# Patient Record
Sex: FEMALE | Race: WHITE
[De-identification: names, ages, dates, MRNs, and addresses within clinical notes are randomized per-mention and may not be internally consistent; named-entity substitution may affect disease eponyms.]

---

## 2018-07-14 ENCOUNTER — HOSPITAL ENCOUNTER (EMERGENCY)
Dept: HOSPITAL 25 - UCCORT | Age: 48
Discharge: HOME | End: 2018-07-14
Payer: COMMERCIAL

## 2018-07-14 VITALS — DIASTOLIC BLOOD PRESSURE: 87 MMHG | SYSTOLIC BLOOD PRESSURE: 141 MMHG

## 2018-07-14 DIAGNOSIS — N39.0: Primary | ICD-10-CM

## 2018-07-14 PROCEDURE — 87186 SC STD MICRODIL/AGAR DIL: CPT

## 2018-07-14 PROCEDURE — 81003 URINALYSIS AUTO W/O SCOPE: CPT

## 2018-07-14 PROCEDURE — 87077 CULTURE AEROBIC IDENTIFY: CPT

## 2018-07-14 PROCEDURE — 87086 URINE CULTURE/COLONY COUNT: CPT

## 2018-07-14 PROCEDURE — G0463 HOSPITAL OUTPT CLINIC VISIT: HCPCS

## 2018-07-14 PROCEDURE — 99202 OFFICE O/P NEW SF 15 MIN: CPT

## 2018-07-14 NOTE — UC
Complaint Female HPI





- HPI Summary


HPI Summary: 





48 year old female with UTI Sx. Woke this morning with urinary freq/pain/

burning. No fever 


[ End ]





- History Of Current Complaint


Chief Complaint: UCGU


Stated Complaint: URINARY


Time Seen by Provider: 07/14/18 10:26


Hx Obtained From: Patient


Hx Last Menstrual Period: 7/7/18


Onset/Duration: Sudden Onset


Timing: Constant


Severity Initially: Mild


Pain Intensity: 0


Aggravating Factor(s): Urination


Associated Signs And Symptoms: Positive: Negative





- Allergies/Home Medications


Allergies/Adverse Reactions: 


 Allergies











Allergy/AdvReac Type Severity Reaction Status Date / Time


 


No Known Allergies Allergy   Verified 07/14/18 10:37














PMH/Surg Hx/FS Hx/Imm Hx


Previously Healthy: Yes





- Surgical History


Surgical History: None





- Family History


Known Family History: Positive: None





- Social History


Occupation: Employed Full-time -  at [employer]


Alcohol Use: Occasionally


Substance Use Type: None


Smoking Status (MU): Never Smoked Tobacco





Review of Systems


Genitourinary: Dysuria, Frequency, Urgency


Is Patient Immunocompromised?: No


All Other Systems Reviewed And Are Negative: Yes





Physical Exam


Triage Information Reviewed: Yes


Appearance: Well-Appearing, No Pain Distress, Well-Nourished


Vital Signs: 


 Initial Vital Signs











Temp  98.0 F   07/14/18 10:31


 


Pulse  62   07/14/18 10:31


 


Resp  16   07/14/18 10:31


 


BP  141/87   07/14/18 10:31


 


Pulse Ox  100   07/14/18 10:31











Vital Signs Reviewed: Yes


Eye Exam: Normal


ENT Exam: Normal


Neck exam: Normal


Neck: Positive: 1


Respiratory Exam: Normal


Cardiovascular Exam: Normal


Abdominal Exam: Normal


Abdomen Description: Negative: CVA Tenderness (R), CVA Tenderness (L)


Musculoskeletal Exam: Normal


Neurological Exam: Normal


Psychological Exam: Normal


Skin Exam: Normal





 Complaint Female Dx





- Course


Course Of Treatment: she is aware of SE of abx,. asking for first time dose as 

she can not go to the pharmacy for a few hours





- Differential Dx/Diagnosis


Differential Diagnosis/HQI/PQRI: Urinary Tract Infection


Provider Diagnoses: UTI





Discharge





- Sign-Out/Discharge


Documenting (check all that apply): Patient Departure





- Discharge Plan


Condition: Good


Disposition: HOME


Prescriptions: 


Sulfamethox/Trimethoprim DS* [Bactrim /160 TAB*] 1 tab PO BID 3 Days #6 

tab


Patient Education Materials:  Urinary Tract Infection in Women (DC)


Referrals: 


Non Staff,Doctor [Primary Care Provider] - If Needed





- Billing Disposition and Condition


Condition: GOOD


Disposition: Home

## 2018-07-16 NOTE — UC
Course/Dx





- Course


Course Of Treatment: Patient called.  Patient continues to have symptoms.  

Patient culture reviewed.  Patient is resistant to Bactrim which she was 

prescribed.  There spoke with patient by phone.  No prescription for Macrobid 

as well as Pyridium.  Patient declined Diflucan.  Patient encouraged to drink 

fluids.  Patient cautioned of orange urine.  Return precautions discussed with 

patient.





Discharge





- Sign-Out/Discharge


Documenting (check all that apply): Post-Discharge Follow Up





- Discharge Plan


Condition: Good


Disposition: HOME


Prescriptions: 


Nitrofurantoin Monohyd/M-Cryst [Macrobid 100 mg Capsule] 100 mg PO BID #14 cap


Phenazopyridine TAB* [Pyridium 100 mg TAB*] 100 mg PO TID PRN #9 tab


 PRN Reason: burning with urination


Sulfamethox/Trimethoprim DS* [Bactrim /160 TAB*] 1 tab PO BID 3 Days #6 

tab


Patient Education Materials:  Urinary Tract Infection in Women (DC)


Referrals: 


Non Staff,Doctor [Primary Care Provider] - If Needed





- Billing Disposition and Condition


Condition: GOOD


Disposition: Home